# Patient Record
Sex: MALE | URBAN - METROPOLITAN AREA
[De-identification: names, ages, dates, MRNs, and addresses within clinical notes are randomized per-mention and may not be internally consistent; named-entity substitution may affect disease eponyms.]

---

## 2021-07-21 ENCOUNTER — APPOINTMENT (OUTPATIENT)
Age: 46
Setting detail: DERMATOLOGY
End: 2021-07-23

## 2021-07-21 DIAGNOSIS — Z41.9 ENCOUNTER FOR PROCEDURE FOR PURPOSES OTHER THAN REMEDYING HEALTH STATE, UNSPECIFIED: ICD-10-CM

## 2021-07-21 PROCEDURE — OTHER PRODUCT LINE (ANTI-AGING): OTHER

## 2021-07-21 PROCEDURE — OTHER COSMETIC CONSULTATION: PULSED-DYE LASER: OTHER

## 2021-07-21 PROCEDURE — OTHER COSMETIC CONSULTATION - PULSED-DYE LASER: OTHER

## 2021-07-21 PROCEDURE — OTHER OTHER: OTHER

## 2021-07-21 ASSESSMENT — LOCATION DETAILED DESCRIPTION DERM: LOCATION DETAILED: RIGHT MEDIAL FOREHEAD

## 2021-07-21 ASSESSMENT — LOCATION ZONE DERM: LOCATION ZONE: FACE

## 2021-07-21 ASSESSMENT — LOCATION SIMPLE DESCRIPTION DERM: LOCATION SIMPLE: RIGHT FOREHEAD

## 2021-07-21 NOTE — PROCEDURE: PRODUCT LINE (ANTI-AGING)
Product 40 Units: 0
Product 79 Price (In Dollars - Numeric Only, No Special Characters Or $): 0.00
Product 48 Application Directions: Apply to cleans skin at night.
Product 37 Price (In Dollars - Numeric Only, No Special Characters Or $): 14.00
Product 8 Price (In Dollars - Numeric Only, No Special Characters Or $): 54.00
Product 3 Price (In Dollars - Numeric Only, No Special Characters Or $): $153.00
Product 34 Application Directions: Use as directed
Product 10 Application Directions: Cleanse Morning and Night as directed
Product 41 Price (In Dollars - Numeric Only, No Special Characters Or $): 53.00
Name Of Product 21: Allastin
Product 45 Application Directions: Apply daily as directed
Product 42 Price (In Dollars - Numeric Only, No Special Characters Or $): 32.00
Name Of Product 47: Shefali
Product 1 Price (In Dollars - Numeric Only, No Special Characters Or $): 164.00
Product 17 Price (In Dollars - Numeric Only, No Special Characters Or $): 25.00
Product 47 Price (In Dollars - Numeric Only, No Special Characters Or $): 179.00
Name Of Product 8: ZO Hydrating cleanser
Name Of Product 22: Elta MD 30 Sport
Name Of Product 17: Elta MD Gentle Cleanser
Name Of Product 35: Elta MD PM Therapy
Product 4 Application Directions: Use as directed at night
Product 36 Application Directions: Use at night, as directed
Name Of Product 49: ZO Non tinted spf 50
Name Of Product 27: Elta MD UV clear Spf 46
Name Of Product 12: ZO Exfoliating Polish
Product 21 Price (In Dollars - Numeric Only, No Special Characters Or $): 200.00
Product 48 Price (In Dollars - Numeric Only, No Special Characters Or $): 130.00
Name Of Product 48: Growth Factor Eye Serum
Product 36 Price (In Dollars - Numeric Only, No Special Characters Or $): 114.00
Product 5 Application Directions: Morning and Night
Name Of Product 15: Elta MD SPF 40 Daily UV Tinted
Name Of Product 31: Elta MD lip balm
Product 17 Application Directions: Cleanse with 1 pump morning and night
Product 23 Units: 1
Name Of Product 24: Complexion Renewal Pads
Product 20 Price (In Dollars - Numeric Only, No Special Characters Or $): 33.00
Product 6 Application Directions: Applying after cleansing in the evening
Name Of Product 30: Elta MD Enzyme Gel
Product 12 Application Directions: Cleanse 2-3 times per week in the morning.
Product 28 Application Directions: Apply as directed by provider
Detail Level: Zone
Product 12 Price (In Dollars - Numeric Only, No Special Characters Or $): 77.00
Product 7 Price (In Dollars - Numeric Only, No Special Characters Or $): 116.00
Product 15 Application Directions: Apply daily in morning, reapply throughout the day.
Product 39 Price (In Dollars - Numeric Only, No Special Characters Or $): 73.00
Product 49 Application Directions: Apply daily
Name Of Product 19: ZO Pigment Control
Product 22 Application Directions: Apply 20 minutes before sun exposure
Name Of Product 42: Elta MD spf 47 pure
Name Of Product 25: ZO Sulfur Mask
Name Of Product 3: ZO Hydra Firm
Name Of Product 37: Benzolyl Peroxide Wash
Product 24 Application Directions: Apply after cleansing morning and night.
Product 23 Application Directions: Use morning and night on hands and chest. At night to layer with Tretinoin and light moistuizer if necessary.
Product 43 Application Directions: Use at night as directed
Name Of Product 7: ZO Recovery cream
Name Of Product 32: Elta MD SPF 30 Lotion
Product 23 Price (In Dollars - Numeric Only, No Special Characters Or $): 70.00
Name Of Product 20: Elta MD UV Physical SPF 41
Assigning Risk Information: Per AMA, level of risk is based upon consequences of the problem(s) addressed at the encounter when appropriately treated. Risk also includes medical decision making related to the need to initiate or forego further testing, treatment and/or hospitalization. Over the counter medication are assigned a risk level of low. Prescription medication management is assigned a risk level of moderate.
Name Of Product 28: Elta MD Moisturizer
Name Of Product 44: JUAN Valle Serum
Product 1 Application Directions: Per directions, in the morning
Name Of Product 6: ZO Wrinkle and Texture Repair
Product 30 Price (In Dollars - Numeric Only, No Special Characters Or $): 16.00
Product 32 Price (In Dollars - Numeric Only, No Special Characters Or $): 38.00
Allow Plan To Count Towards E/M Coding: Yes
Product 31 Price (In Dollars - Numeric Only, No Special Characters Or $): 12.00
Name Of Product 29: Elta MD Barrier cream
Product 44 Application Directions: Use as directed on clean dry skin morning and night
Name Of Product 14: Skin Medica TNS Serum
Product 2 Application Directions: Daytime~ after face cleanse before applying SPF
Product 27 Application Directions: Apply in the morning as part of daily routine.
Product 40 Application Directions: Use as directed each morning
Product 16 Application Directions: Apply at night after cleaning face and treatment creams.
Name Of Product 4: Recovery Night Repair
Product 19 Application Directions: Apply 1 pump to clean skin daily as instructed
Product 40 Price (In Dollars - Numeric Only, No Special Characters Or $): 162.00
Product 44 Price (In Dollars - Numeric Only, No Special Characters Or $): 235.00
Product 38 Application Directions: Apply to clean skin in the AM as directed
Product 26 Application Directions: Apply to healed scar twice daily.
Product 9 Application Directions: Use daily in the morning
Name Of Product 10: ZO Gentle Skin Cleanser
Name Of Product 36: ZO Retinol Repair
Product 13 Application Directions: Apply to clean face at night.
Name Of Product 26: Silagen Scar Gel with SPF
Name Of Product 43: Hydrating Cream
Name Of Product 1: ZO Daily Power Defense
Product 8 Application Directions: Use morning and night
Name Of Product 9: ZO Smart Tone
Product 11 Application Directions: Use as directed around the eyes at night.
Product 35 Price (In Dollars - Numeric Only, No Special Characters Or $): 36.00
Name Of Product 23: Pigment Control w/
Product 29 Price (In Dollars - Numeric Only, No Special Characters Or $): 50.00
Product 34 Price (In Dollars - Numeric Only, No Special Characters Or $): 88.00
Product 28 Price (In Dollars - Numeric Only, No Special Characters Or $): 13.00
Product 3 Application Directions: Apply 1/2 a pea size around eyes morning and night after cleansing face.
Product 6 Price (In Dollars - Numeric Only, No Special Characters Or $): 158.00
Name Of Product 41: Elta MD sport Spf 50
Name Of Product 38: Elta MD AM Therapy
Name Of Product 45: Elta MD Daily UV
Risk Of Complication Category: No MDM
Name Of Product 13: ZO Radical Night Repair.
Name Of Product 40: ZO Growth Factor Serum
Product 25 Price (In Dollars - Numeric Only, No Special Characters Or $): 42.00
Product 25 Application Directions: Apply at night after cleansing, twice a week. Leave on for 20-25 minutes then rinse.
Name Of Product 11: ZO Intensive Eye Repair
Product 26 Price (In Dollars - Numeric Only, No Special Characters Or $): 55.00
Name Of Product 5: ZO Growth Factor Eye Serum
Product 19 Price (In Dollars - Numeric Only, No Special Characters Or $): 72.00
Product 18 Application Directions: Apply directly after cleansing morning and night
Name Of Product 46: Elta MD Aero spf 45
Product 11 Price (In Dollars - Numeric Only, No Special Characters Or $): 153.00
Product 21 Application Directions: Apply on clean face morning and night
Name Of Product 34: Nutrofol Supplement
Product 13 Price (In Dollars - Numeric Only, No Special Characters Or $): $167.00
Name Of Product 33: Glycogent/ Exfoliating Accelerator
Name Of Product 2: ZO Vitamin C
Product 2 Price (In Dollars - Numeric Only, No Special Characters Or $): 102.00
Product 37 Application Directions: Use in shower morning and after a workout.
Product 14 Application Directions: Apply at night to clean skin.
Product 14 Price (In Dollars - Numeric Only, No Special Characters Or $): 187.00

## 2021-07-21 NOTE — PROCEDURE: OTHER
Render Risk Assessment In Note?: no
Note Text (......Xxx Chief Complaint.): This diagnosis correlates with the
Other (Free Text): Patient had a cyst removed at another office back in December 2020. He purchased TCA on [employer] to help minimize the scar as a result of overdoing the acid he hyperpigmented the area.  He stated he did the peel from January until June twice a month. Once he noticed the area was hyperpigmenting he applied more. \\nHe is here today to look into doing Fractionated laser to treat the area. 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 He needs to allow his skin to take a break. I prescribed HQ 4% to use for the time being. He has a wedding in 3 weeks and will be going back to the office in a month and would like to see the pigmentation gone. I let him know it will likely still be there and the best thing is to stop for the time being otherwise he will make the pigmentation worse. \\nFollow up 2 month. bbw
Detail Level: Detailed